# Patient Record
Sex: FEMALE | Race: WHITE | NOT HISPANIC OR LATINO | URBAN - METROPOLITAN AREA
[De-identification: names, ages, dates, MRNs, and addresses within clinical notes are randomized per-mention and may not be internally consistent; named-entity substitution may affect disease eponyms.]

---

## 2021-03-09 ENCOUNTER — OFFICE VISIT (OUTPATIENT)
Dept: URBAN - METROPOLITAN AREA CLINIC 27 | Facility: CLINIC | Age: 81
End: 2021-03-09

## 2021-05-27 ENCOUNTER — OFFICE VISIT (OUTPATIENT)
Dept: URBAN - METROPOLITAN AREA CLINIC 27 | Facility: CLINIC | Age: 81
End: 2021-05-27

## 2021-05-28 ENCOUNTER — LAB OUTSIDE AN ENCOUNTER (OUTPATIENT)
Dept: URBAN - METROPOLITAN AREA CLINIC 121 | Facility: CLINIC | Age: 81
End: 2021-05-28

## 2021-05-28 LAB
A/G RATIO: (no result)
ALBUMIN: 3.9
ALK PHOS: 100
ANTI-HBC: (no result)
B-12: 393
BASOPH COUNT: (no result)
BASOPHIL %: 0.8
BG RANDOM: 97
BILI TOTAL: 0.4
BUN/CREAT: (no result)
BUN: 10
CALCIUM: 9.8
CHLORIDE: 106
CO2: 26
CREATININE: 0.93
EOS COUNT: (no result)
EOSINOPHIL %: 0.3
FERRITIN: 27
FOLATE: (no result)
GLOBULIN TOT: (no result)
HBSAG: (no result)
HCT: 37.2
HEP C AB: (no result)
HGB: 12.1
IRON SATUR %: (no result)
IRON: 80
LYMPHS %: 31.6
MCH: 29.7
MCHC: (no result)
MCV: 91.4
MONOCYTE %: 7.4
MONOSCT AUTO: (no result)
PLATELETS: (no result)
PMN %: 59.9
POTASSIUM: 4.9
PROTEIN, TOT: 6.3
RBC: (no result)
RDW: 12.6
RETIC COUNT: 1.3
SGOT (AST): 21
SGPT (ALT): 13
TIBC: (no result)
TSH: 0.99
WBC: (no result)
ZZ-GE-UNK: (no result)
ZZ-GE-UNK: 0.02

## 2021-06-16 ENCOUNTER — LAB OUTSIDE AN ENCOUNTER (OUTPATIENT)
Dept: URBAN - METROPOLITAN AREA CLINIC 121 | Facility: CLINIC | Age: 81
End: 2021-06-16

## 2021-06-16 LAB — ZZ-GE-UNK: (no result)

## 2021-06-29 ENCOUNTER — OFFICE VISIT (OUTPATIENT)
Dept: URBAN - METROPOLITAN AREA SURGERY CENTER 7 | Facility: SURGERY CENTER | Age: 81
End: 2021-06-29

## 2021-09-23 ENCOUNTER — OFFICE VISIT (OUTPATIENT)
Dept: URBAN - METROPOLITAN AREA CLINIC 27 | Facility: CLINIC | Age: 81
End: 2021-09-23

## 2021-11-05 ENCOUNTER — OFFICE VISIT (OUTPATIENT)
Dept: URBAN - METROPOLITAN AREA CLINIC 27 | Facility: CLINIC | Age: 81
End: 2021-11-05

## 2022-04-30 ENCOUNTER — TELEPHONE ENCOUNTER (OUTPATIENT)
Dept: URBAN - METROPOLITAN AREA CLINIC 121 | Facility: CLINIC | Age: 82
End: 2022-04-30

## 2022-04-30 RX ORDER — METOPROLOL SUCCINATE 100 MG/1
TABLET, EXTENDED RELEASE ORAL
OUTPATIENT
Start: 2018-06-14

## 2022-04-30 RX ORDER — ASPIRIN 81 MG/1
TABLET, COATED ORAL
OUTPATIENT
Start: 2009-10-07

## 2022-04-30 RX ORDER — METOPROLOL SUCCINATE 100 MG/1
TABLET, EXTENDED RELEASE ORAL
OUTPATIENT
Start: 2018-06-14 | End: 2021-05-27

## 2022-04-30 RX ORDER — CHOLESTYRAMINE 4 G/9G
POWDER, FOR SUSPENSION ORAL
OUTPATIENT
End: 2021-09-23

## 2022-04-30 RX ORDER — ASPIRIN 81 MG/1
PRN TABLET, COATED ORAL
OUTPATIENT
Start: 2011-12-13 | End: 2021-06-29

## 2022-04-30 RX ORDER — MESALAMINE 1.2 G/1
TAKE 4 TABLET BY MOUTH ONCE A DAY TABLET, DELAYED RELEASE ORAL
OUTPATIENT
Start: 2021-09-23

## 2022-04-30 RX ORDER — ASPIRIN 81 MG/1
PRN TABLET, COATED ORAL
OUTPATIENT
Start: 2011-12-13

## 2022-05-01 ENCOUNTER — TELEPHONE ENCOUNTER (OUTPATIENT)
Dept: URBAN - METROPOLITAN AREA CLINIC 121 | Facility: CLINIC | Age: 82
End: 2022-05-01

## 2022-05-01 RX ORDER — MESALAMINE 1.2 G/1
TAKE 4 TABLET BY MOUTH ONCE A DAY TABLET, DELAYED RELEASE ORAL
Status: ACTIVE | COMMUNITY
Start: 2021-09-23

## 2022-05-01 RX ORDER — CHOLESTYRAMINE 4 G/9G
TAKE 1 PACKET BY MOUTH TWICE A DAY POWDER, FOR SUSPENSION ORAL
Status: ACTIVE | COMMUNITY
Start: 2021-09-23

## 2022-05-01 RX ORDER — APIXABAN 2.5 MG/1
TABLET, FILM COATED ORAL
Status: ACTIVE | COMMUNITY
Start: 2021-05-27

## 2022-05-18 ENCOUNTER — ERX REFILL RESPONSE (OUTPATIENT)
Dept: URBAN - METROPOLITAN AREA CLINIC 27 | Facility: CLINIC | Age: 82
End: 2022-05-18

## 2022-05-18 RX ORDER — CHOLESTYRAMINE 4 G/9G
TAKE 1 PACKET BY MOUTH TWICE A DAY POWDER, FOR SUSPENSION ORAL
Qty: 60 PACKET | Refills: 4 | OUTPATIENT

## 2022-05-20 ENCOUNTER — OFFICE VISIT (OUTPATIENT)
Dept: URBAN - METROPOLITAN AREA CLINIC 27 | Facility: CLINIC | Age: 82
End: 2022-05-20
Payer: MEDICARE

## 2022-05-20 ENCOUNTER — DASHBOARD ENCOUNTERS (OUTPATIENT)
Age: 82
End: 2022-05-20

## 2022-05-20 VITALS
DIASTOLIC BLOOD PRESSURE: 75 MMHG | SYSTOLIC BLOOD PRESSURE: 131 MMHG | WEIGHT: 110 LBS | HEIGHT: 66 IN | BODY MASS INDEX: 17.68 KG/M2 | HEART RATE: 64 BPM

## 2022-05-20 DIAGNOSIS — Z12.11 ENCOUNTER FOR SCREENING FOR MALIGNANT NEOPLASM OF COLON: ICD-10-CM

## 2022-05-20 DIAGNOSIS — K50.90 CROHN'S DISEASE, UNSPECIFIED, WITHOUT COMPLICATIONS: ICD-10-CM

## 2022-05-20 PROCEDURE — 99213 OFFICE O/P EST LOW 20 MIN: CPT | Performed by: INTERNAL MEDICINE

## 2022-05-20 RX ORDER — MESALAMINE 1.2 G/1
TAKE 4 TABLET BY MOUTH ONCE A DAY TABLET, DELAYED RELEASE ORAL
Status: ACTIVE | COMMUNITY
Start: 2021-09-23

## 2022-05-20 RX ORDER — CHOLESTYRAMINE 4 G/9G
TAKE 1 PACKET BY MOUTH TWICE A DAY POWDER, FOR SUSPENSION ORAL
Status: ACTIVE | COMMUNITY
Start: 2021-09-23

## 2022-05-20 RX ORDER — APIXABAN 2.5 MG/1
TABLET, FILM COATED ORAL
Status: ACTIVE | COMMUNITY
Start: 2021-05-27

## 2022-05-20 RX ORDER — AMLODIPINE BESYLATE 5 MG/1
1 TABLET TABLET ORAL ONCE A DAY
Status: ACTIVE | COMMUNITY

## 2022-05-20 RX ORDER — CHOLESTYRAMINE 4 G/9G
TAKE 1 PACKET BY MOUTH TWICE A DAY POWDER, FOR SUSPENSION ORAL
Qty: 60 PACKET | Refills: 4 | Status: DISCONTINUED | COMMUNITY

## 2022-05-20 NOTE — HPI-TODAY'S VISIT:
82 yo female seen in f/u for her Crohns.Main c/o of sinus issues. no meds working. Following up with urology for kidney issues. using mesalamin and queston and helps alot.  Last July colon and MRE. stable. Left hyrdro noted then. From GI standpoint is stable.

## 2022-07-18 ENCOUNTER — TELEPHONE ENCOUNTER (OUTPATIENT)
Dept: URBAN - METROPOLITAN AREA CLINIC 27 | Facility: CLINIC | Age: 82
End: 2022-07-18

## 2022-07-18 RX ORDER — CHOLESTYRAMINE 4 G/9G
TAKE 1 PACKET BY MOUTH TWICE A DAY POWDER, FOR SUSPENSION ORAL TWICE A DAY
Qty: 180 | Refills: 0
Start: 2021-09-23

## 2022-08-22 ENCOUNTER — ERX REFILL RESPONSE (OUTPATIENT)
Dept: URBAN - METROPOLITAN AREA CLINIC 27 | Facility: CLINIC | Age: 82
End: 2022-08-22

## 2022-08-22 RX ORDER — MESALAMINE 1.2 G/1
TAKE 4 TABLETS BY MOUTH EVERY DAY TABLET, DELAYED RELEASE ORAL
Qty: 120 TABLET | Refills: 3 | OUTPATIENT

## 2022-08-22 RX ORDER — MESALAMINE 1.2 G/1
TAKE 4 TABLET BY MOUTH ONCE A DAY TABLET, DELAYED RELEASE ORAL
OUTPATIENT

## 2022-11-14 ENCOUNTER — ERX REFILL RESPONSE (OUTPATIENT)
Dept: URBAN - METROPOLITAN AREA CLINIC 27 | Facility: CLINIC | Age: 82
End: 2022-11-14

## 2022-11-14 RX ORDER — MESALAMINE 1.2 G/1
TAKE 4 TABLETS BY MOUTH EVERY DAY 30 TABLET, DELAYED RELEASE ORAL
Qty: 120 TABLET | Refills: 3 | OUTPATIENT

## 2022-11-14 RX ORDER — MESALAMINE 1.2 G/1
TAKE 4 TABLETS BY MOUTH EVERY DAY TABLET, DELAYED RELEASE ORAL
Qty: 120 TABLET | Refills: 3 | OUTPATIENT

## 2023-02-09 ENCOUNTER — ERX REFILL RESPONSE (OUTPATIENT)
Dept: URBAN - METROPOLITAN AREA CLINIC 27 | Facility: CLINIC | Age: 83
End: 2023-02-09

## 2023-02-09 RX ORDER — CHOLESTYRAMINE 4 G/9G
TAKE 1 PACKET BY MOUTH TWICE A DAY ORAL 90 DAYS POWDER, FOR SUSPENSION ORAL
Qty: 180 PACK | Refills: 3 | OUTPATIENT

## 2023-02-09 RX ORDER — CHOLESTYRAMINE 4 G/9G
TAKE 1 PACKET BY MOUTH TWICE A DAY POWDER, FOR SUSPENSION ORAL TWICE A DAY
Qty: 180 | Refills: 0 | OUTPATIENT

## 2023-03-17 ENCOUNTER — ERX REFILL RESPONSE (OUTPATIENT)
Dept: URBAN - METROPOLITAN AREA CLINIC 27 | Facility: CLINIC | Age: 83
End: 2023-03-17

## 2023-03-17 RX ORDER — MESALAMINE 1.2 G/1
TAKE 4 TABLETS BY MOUTH EVERY DAY 30 TABLET, DELAYED RELEASE ORAL
Qty: 120 TABLET | Refills: 3 | OUTPATIENT

## 2023-07-14 ENCOUNTER — ERX REFILL RESPONSE (OUTPATIENT)
Dept: URBAN - METROPOLITAN AREA CLINIC 27 | Facility: CLINIC | Age: 83
End: 2023-07-14

## 2023-07-14 RX ORDER — MESALAMINE 1.2 G/1
TAKE 4 TABLETS BY MOUTH EVERY DAY 30 TABLET, DELAYED RELEASE ORAL
Qty: 120 TABLET | Refills: 3 | OUTPATIENT

## 2023-11-16 ENCOUNTER — ERX REFILL RESPONSE (OUTPATIENT)
Dept: URBAN - METROPOLITAN AREA CLINIC 27 | Facility: CLINIC | Age: 83
End: 2023-11-16

## 2023-11-16 RX ORDER — MESALAMINE 1.2 G/1
TAKE 4 TABLETS BY MOUTH EVERY DAY 30 TABLET, DELAYED RELEASE ORAL
Qty: 120 TABLET | Refills: 3 | OUTPATIENT

## 2023-12-07 ENCOUNTER — TELEPHONE ENCOUNTER (OUTPATIENT)
Dept: URBAN - METROPOLITAN AREA CLINIC 27 | Facility: CLINIC | Age: 83
End: 2023-12-07

## 2023-12-08 ENCOUNTER — ERX REFILL RESPONSE (OUTPATIENT)
Dept: URBAN - METROPOLITAN AREA CLINIC 27 | Facility: CLINIC | Age: 83
End: 2023-12-08

## 2023-12-08 RX ORDER — CHOLESTYRAMINE POWDER FOR SUSPENSION 4 G/8.78G
TAKE 1 PACKET BY MOUTH TWICE A DAY ORAL 90 DAYS POWDER, FOR SUSPENSION ORAL
Qty: 180 PACK | Refills: 3 | OUTPATIENT

## 2023-12-08 RX ORDER — CHOLESTYRAMINE 4 G/9G
TAKE 1 PACKET BY MOUTH TWICE A DAY ORAL 90 DAYS POWDER, FOR SUSPENSION ORAL
Qty: 180 PACK | Refills: 3 | OUTPATIENT

## 2024-05-16 ENCOUNTER — OFFICE VISIT (OUTPATIENT)
Dept: URBAN - METROPOLITAN AREA CLINIC 27 | Facility: CLINIC | Age: 84
End: 2024-05-16